# Patient Record
Sex: FEMALE | Race: WHITE | NOT HISPANIC OR LATINO | Employment: UNEMPLOYED | URBAN - METROPOLITAN AREA
[De-identification: names, ages, dates, MRNs, and addresses within clinical notes are randomized per-mention and may not be internally consistent; named-entity substitution may affect disease eponyms.]

---

## 2024-06-06 ENCOUNTER — TELEPHONE (OUTPATIENT)
Dept: INTERNAL MEDICINE | Facility: CLINIC | Age: 54
End: 2024-06-06
Payer: MEDICAID

## 2024-06-06 NOTE — TELEPHONE ENCOUNTER
Spoke with Dave.  Instructed to call the [facility] due to a wait list of accepting new medicaid patients. Gave the number to Dave to call and check on appointment.

## 2024-06-06 NOTE — TELEPHONE ENCOUNTER
----- Message from Amelie Celeste sent at 6/6/2024 12:16 PM CDT -----  Contact: Dave()  Naveen called in regards to scheduling his wife an appointment. She  will be a new patient and will need to be seen for Annual and Medication refills. Call Back is [phone]